# Patient Record
Sex: MALE | Race: OTHER | HISPANIC OR LATINO | Employment: UNEMPLOYED | URBAN - METROPOLITAN AREA
[De-identification: names, ages, dates, MRNs, and addresses within clinical notes are randomized per-mention and may not be internally consistent; named-entity substitution may affect disease eponyms.]

---

## 2023-02-16 ENCOUNTER — HOSPITAL ENCOUNTER (EMERGENCY)
Facility: HOSPITAL | Age: 18
Discharge: HOME | End: 2023-02-16
Attending: EMERGENCY MEDICINE
Payer: OTHER GOVERNMENT

## 2023-02-16 VITALS
DIASTOLIC BLOOD PRESSURE: 61 MMHG | WEIGHT: 180 LBS | OXYGEN SATURATION: 99 % | TEMPERATURE: 99 F | SYSTOLIC BLOOD PRESSURE: 126 MMHG | RESPIRATION RATE: 20 BRPM | HEART RATE: 73 BPM | BODY MASS INDEX: 31.89 KG/M2 | HEIGHT: 63 IN

## 2023-02-16 DIAGNOSIS — R07.9 CHEST PAIN: ICD-10-CM

## 2023-02-16 LAB
ALBUMIN SERPL BCP-MCNC: 4.5 G/DL (ref 3.5–5.2)
ALP SERPL-CCNC: 86 U/L (ref 55–135)
ALT SERPL W/O P-5'-P-CCNC: 52 U/L (ref 10–44)
ANION GAP SERPL CALC-SCNC: 11 MMOL/L (ref 8–16)
AST SERPL-CCNC: 32 U/L (ref 10–40)
BASOPHILS # BLD AUTO: 0.05 K/UL (ref 0–0.2)
BASOPHILS NFR BLD: 0.5 % (ref 0–1.9)
BILIRUB SERPL-MCNC: 0.4 MG/DL (ref 0.1–1)
BUN SERPL-MCNC: 10 MG/DL (ref 6–20)
CALCIUM SERPL-MCNC: 10 MG/DL (ref 8.7–10.5)
CHLORIDE SERPL-SCNC: 103 MMOL/L (ref 95–110)
CO2 SERPL-SCNC: 24 MMOL/L (ref 23–29)
CREAT SERPL-MCNC: 0.8 MG/DL (ref 0.5–1.4)
DIFFERENTIAL METHOD: ABNORMAL
EOSINOPHIL # BLD AUTO: 0.1 K/UL (ref 0–0.5)
EOSINOPHIL NFR BLD: 0.6 % (ref 0–8)
ERYTHROCYTE [DISTWIDTH] IN BLOOD BY AUTOMATED COUNT: 13.3 % (ref 11.5–14.5)
EST. GFR  (NO RACE VARIABLE): >60 ML/MIN/1.73 M^2
GLUCOSE SERPL-MCNC: 101 MG/DL (ref 70–110)
HCT VFR BLD AUTO: 45.1 % (ref 40–54)
HGB BLD-MCNC: 15.6 G/DL (ref 14–18)
IMM GRANULOCYTES # BLD AUTO: 0.05 K/UL (ref 0–0.04)
IMM GRANULOCYTES NFR BLD AUTO: 0.5 % (ref 0–0.5)
LYMPHOCYTES # BLD AUTO: 1.5 K/UL (ref 1–4.8)
LYMPHOCYTES NFR BLD: 15 % (ref 18–48)
MCH RBC QN AUTO: 30.3 PG (ref 27–31)
MCHC RBC AUTO-ENTMCNC: 34.6 G/DL (ref 32–36)
MCV RBC AUTO: 88 FL (ref 82–98)
MONOCYTES # BLD AUTO: 0.7 K/UL (ref 0.3–1)
MONOCYTES NFR BLD: 7.2 % (ref 4–15)
NEUTROPHILS # BLD AUTO: 7.6 K/UL (ref 1.8–7.7)
NEUTROPHILS NFR BLD: 76.2 % (ref 38–73)
NRBC BLD-RTO: 0 /100 WBC
PLATELET # BLD AUTO: 444 K/UL (ref 150–450)
PMV BLD AUTO: 9 FL (ref 9.2–12.9)
POTASSIUM SERPL-SCNC: 4.3 MMOL/L (ref 3.5–5.1)
PROT SERPL-MCNC: 9.2 G/DL (ref 6–8.4)
RBC # BLD AUTO: 5.15 M/UL (ref 4.6–6.2)
SODIUM SERPL-SCNC: 138 MMOL/L (ref 136–145)
TROPONIN I SERPL DL<=0.01 NG/ML-MCNC: <0.006 NG/ML (ref 0–0.03)
WBC # BLD AUTO: 10 K/UL (ref 3.9–12.7)

## 2023-02-16 PROCEDURE — 80053 COMPREHEN METABOLIC PANEL: CPT

## 2023-02-16 PROCEDURE — 99285 EMERGENCY DEPT VISIT HI MDM: CPT | Mod: 25

## 2023-02-16 PROCEDURE — 84484 ASSAY OF TROPONIN QUANT: CPT

## 2023-02-16 PROCEDURE — 93010 EKG 12-LEAD: ICD-10-PCS | Mod: ,,, | Performed by: INTERNAL MEDICINE

## 2023-02-16 PROCEDURE — 93005 ELECTROCARDIOGRAM TRACING: CPT

## 2023-02-16 PROCEDURE — 93010 ELECTROCARDIOGRAM REPORT: CPT | Mod: ,,, | Performed by: INTERNAL MEDICINE

## 2023-02-16 PROCEDURE — 96374 THER/PROPH/DIAG INJ IV PUSH: CPT

## 2023-02-16 PROCEDURE — 63600175 PHARM REV CODE 636 W HCPCS

## 2023-02-16 PROCEDURE — 85025 COMPLETE CBC W/AUTO DIFF WBC: CPT

## 2023-02-16 RX ORDER — KETOROLAC TROMETHAMINE 30 MG/ML
15 INJECTION, SOLUTION INTRAMUSCULAR; INTRAVENOUS
Status: COMPLETED | OUTPATIENT
Start: 2023-02-16 | End: 2023-02-16

## 2023-02-16 RX ORDER — NAPROXEN 500 MG/1
500 TABLET ORAL 2 TIMES DAILY
Qty: 20 TABLET | Refills: 0 | Status: SHIPPED | OUTPATIENT
Start: 2023-02-16

## 2023-02-16 RX ADMIN — KETOROLAC TROMETHAMINE 15 MG: 30 INJECTION, SOLUTION INTRAMUSCULAR; INTRAVENOUS at 08:02

## 2023-02-16 NOTE — DISCHARGE INSTRUCTIONS

## 2023-02-16 NOTE — ED PROVIDER NOTES
Encounter Date: 2/16/2023    SCRIBE #1 NOTE: I, Danika Chowdhury, am scribing for, and in the presence of,  Wilfredo Eaton PA-C. Other sections scribed: HPI, ROS.     History     Chief Complaint   Patient presents with    Chest Pain     The patient reports a 4/10, intermittent, mid sternal, right anterior and right upper back pain that started this morning while running. Patient states that he has been having this pain intermittently for months, and reports that he was [place] with [place]. Pt reports that he had sob this morning while running but the sob has since subsided. Denies nausea, vomiting, lightheadedness.     CC: Chest pain    HPI: This is a 17 y.o. M who has no PMHx who presents to the ED for emergent evaluation of acute right sided CP that is worse with movement that began 6 months ago. Pt endorses intermittent left sided CP. However, he states that the CP is significant on the right side. He rated the CP a 7/10 when running this morning. The CP has subsided since running this morning and currently rates the CP a 3/10. He describes the CP as a tightening sensation. He attempted taking Ibuprofen for the CP for 2 weeks, which he last took 2-3 weeks ago. No OTC treatment attempted PTA. He endorses intermittent SOB, but not currently. His vaccinations are up to date. He has no known drug allergies. Pt denies fever, chills, cough, nasal congestion, runny nose, sore throat, adbominal pain, nausea, vomiting, diarrhea, dysuria, or hematuria.      The history is provided by the patient. No  was used.   Review of patient's allergies indicates:  No Known Allergies  History reviewed. No pertinent past medical history.  History reviewed. No pertinent surgical history.  History reviewed. No pertinent family history.  Social History     Tobacco Use    Smoking status: Never    Smokeless tobacco: Never   Substance Use Topics    Alcohol use: Not Currently     Drug use: Never     Review of Systems   Constitutional:  Negative for chills and fever.   HENT:  Negative for congestion, rhinorrhea and sore throat.    Respiratory:  Negative for cough and shortness of breath.    Cardiovascular:  Positive for chest pain.   Gastrointestinal:  Negative for abdominal pain, diarrhea, nausea and vomiting.   Genitourinary:  Negative for dysuria and hematuria.   Musculoskeletal:  Negative for back pain.   Skin:  Negative for rash.   Neurological:  Negative for weakness.   Hematological:  Does not bruise/bleed easily.     Physical Exam     Initial Vitals [02/16/23 0738]   BP Pulse Resp Temp SpO2   128/65 92 16 98.1 °F (36.7 °C) 97 %      MAP       --         Physical Exam    Nursing note and vitals reviewed.  Constitutional: He appears well-developed and well-nourished. He is not diaphoretic.  Non-toxic appearance. No distress.   HENT:   Head: Normocephalic and atraumatic.   Right Ear: Hearing, tympanic membrane, external ear and ear canal normal. Tympanic membrane is not perforated, not erythematous and not bulging.   Left Ear: Hearing, tympanic membrane, external ear and ear canal normal. Tympanic membrane is not perforated, not erythematous and not bulging.   Nose: Nose normal.   Mouth/Throat: Uvula is midline and oropharynx is clear and moist.   Eyes: Conjunctivae and EOM are normal.   Neck: Neck supple.   Normal range of motion.   Full passive range of motion without pain.     Cardiovascular:            Pulses:       Radial pulses are 2+ on the right side and 2+ on the left side.   Pulmonary/Chest:   No erythema, edema, bruising, rash, or cellulitis patient's chest.   Musculoskeletal:      Cervical back: Full passive range of motion without pain, normal range of motion and neck supple. No rigidity.     Neurological: He is alert.   Skin: Skin is warm and dry. No rash noted.       ED Course   Procedures  Labs Reviewed   CBC W/ AUTO DIFFERENTIAL - Abnormal; Notable for the following  "components:       Result Value    MPV 9.0 (*)     Immature Grans (Abs) 0.05 (*)     Gran % 76.2 (*)     Lymph % 15.0 (*)     All other components within normal limits   COMPREHENSIVE METABOLIC PANEL - Abnormal; Notable for the following components:    Total Protein 9.2 (*)     ALT 52 (*)     All other components within normal limits   TROPONIN I     EKG Readings: (Independently Interpreted)   Initial Reading: No STEMI. Rhythm: Normal Sinus Rhythm. Heart Rate: 88.   Rightward axis.   ECG Results              EKG 12-lead (Final result)  Result time 02/16/23 16:57:10      Final result by Interface, Lab In [facility] (02/16/23 16:57:10)                   Narrative:    Test Reason : R07.9,    Vent. Rate : 090 BPM     Atrial Rate : 090 BPM     P-R Int : 140 ms          QRS Dur : 090 ms      QT Int : 340 ms       P-R-T Axes : 063 099 037 degrees     QTc Int : 415 ms    Normal sinus rhythm  Rightward axis  Borderline Abnormal ECG  No previous ECGs available  Confirmed by Luis Fernando Khanna MD ([id number]) on 2/16/2023 4:56:57 PM    Referred By: [email or web address]   SELF           Confirmed By:Luis Fernando Khanna MD                                  Imaging Results              X-Ray Chest PA And Lateral (Final result)  Result time 02/16/23 08:14:02      Final result by Orlando Chan MD (02/16/23 08:14:02)                   Impression:      No acute cardiopulmonary finding.      Electronically signed by: Orlando Chan MD  Date:    02/16/2023  Time:    08:14               Narrative:    EXAMINATION:  XR CHEST PA AND LATERAL    CLINICAL HISTORY:  Provided history is "  Chest pain, unspecified".    TECHNIQUE:  Frontal and lateral views of the chest were performed.    COMPARISON:  None.    FINDINGS:  Cardiac silhouette is not enlarged. No focal consolidation.  No sizable pleural effusion.  No pneumothorax.                                       Medications   ketorolac injection 15 mg (15 mg Intravenous Given 2/16/23 0844)     Medical Decision Making: "   Clinical Tests:   Lab Tests: Ordered and Reviewed  Radiological Study: Ordered and Reviewed  Medical Tests: Ordered and Reviewed  ED Management:  This is a 17 y.o. M who has no PMHx who presents to the ED for emergent evaluation of acute right sided CP that is worse with movement that began 6 months ago. On physical exam, patient is well-appearing and in no acute distress.  Nontoxic appearing.  Lungs are clear to auscultation bilaterally.  Abdomen is soft and nontender.  No guarding, rigidity, rebound.  2+ radial pulses bilaterally.  Posterior oropharynx is not erythematous.  No edema or exudate.  Uvula midline.  Bilateral tympanic membrane is normal.  No erythema, bulging, or perforations.  Neuro intact.  Strength and sensation intact bilateral upper and lower extremities.  Full range of motion neck.  No neck rigidity.  No erythema, edema, bruising, rash, or cellulitis patient's chest.  EKG revealed normal sinus rhythm with a rate of 88.  Rightward axis.  No STEMI.  Chest x-ray revealed no acute cardiopulmonary processes.  No evidence of any pleural effusions, focal consolidations, pneumothorax.  CBC unremarkable.  CMP revealed ALT 52.  Troponin unremarkable.  Doubt MI.  Will discharge patient on naproxen as needed for pain.  Urged prompt follow-up with PCP for further evaluation.    Strict return precautions given. I discussed with the patient/family the diagnosis, treatment plan, indications for return to the emergency department, and for expected follow-up. The patient/family verbalized an understanding. The patient/family is asked if there are any questions or concerns. We discuss the case, until all issues are addressed to the patient/family's satisfaction. Patient/family understands and is agreeable to the plan. Patient is stable and ready for discharge.          Scribe Attestation:   Scribe #1: I performed the above scribed service and the documentation accurately describes the services I performed. I  attest to the accuracy of the note.                 I, Wilfredo Eaton, personally performed the services described in this documentation. All medical record entries made by the scribe were at my direction and in my presence. I have reviewed the chart and agree that the record reflects my personal performance and is accurate and complete.    Clinical Impression:   Final diagnoses:  [R07.9] Chest pain        ED Disposition Condition    Discharge Stable          ED Prescriptions       Medication Sig Dispense Start Date End Date Auth. Provider    naproxen (NAPROSYN) 500 MG tablet Take 1 tablet (500 mg total) by mouth 2 (two) times daily. 20 tablet 2/16/2023 -- Wilfredo Eaton PA-C          Follow-up Information       Follow up With Specialties Details Why Contact Info     [facility]  Schedule an appointment as soon as possible for a visit in 2 days for further evaluation [address] [facility] [address]  [phone]      [facility] [place] - Emergency Dept Emergency Medicine In 2 days If symptoms worsen [address] [facility] [id number] [phone]             Wilfredo Eaton PA-C  02/16/23 2384

## 2023-02-16 NOTE — Clinical Note
"Todd"Danie Bailey was seen and treated in our emergency department on 2/16/2023.  He may return to work on 02/17/2023.       If you have any questions or concerns, please don't hesitate to call.      Wilfredo Eaton PA-C"

## 2024-01-05 ENCOUNTER — HOSPITAL ENCOUNTER (EMERGENCY)
Facility: HOSPITAL | Age: 19
Discharge: HOME | End: 2024-01-05
Attending: EMERGENCY MEDICINE
Payer: OTHER GOVERNMENT

## 2024-01-05 VITALS
BODY MASS INDEX: 31.89 KG/M2 | DIASTOLIC BLOOD PRESSURE: 90 MMHG | HEIGHT: 63 IN | SYSTOLIC BLOOD PRESSURE: 138 MMHG | RESPIRATION RATE: 16 BRPM | TEMPERATURE: 98 F | WEIGHT: 180 LBS | OXYGEN SATURATION: 98 % | HEART RATE: 86 BPM

## 2024-01-05 DIAGNOSIS — J06.9 VIRAL URI WITH COUGH: Primary | ICD-10-CM

## 2024-01-05 LAB
CTP QC/QA: YES
MOLECULAR STREP A: NEGATIVE
POC MOLECULAR INFLUENZA A AGN: NEGATIVE
POC MOLECULAR INFLUENZA B AGN: NEGATIVE
SARS-COV-2 RDRP RESP QL NAA+PROBE: NEGATIVE

## 2024-01-05 PROCEDURE — 87651 STREP A DNA AMP PROBE: CPT

## 2024-01-05 PROCEDURE — 99282 EMERGENCY DEPT VISIT SF MDM: CPT

## 2024-01-05 PROCEDURE — 87635 SARS-COV-2 COVID-19 AMP PRB: CPT | Performed by: EMERGENCY MEDICINE

## 2024-01-05 PROCEDURE — 87502 INFLUENZA DNA AMP PROBE: CPT

## 2024-01-05 NOTE — DISCHARGE INSTRUCTIONS
You were seen in the emergency department for a sore throat, aches, cough, stuffy nose. Your flu, covid, and strep swabs are negative. Your exam is otherwise reassuring.  You do not have a fever here.  We gave you some anti-inflammatory medication here and you felt better.  We think you're safe to go home.  Please follow up with your primary care provider.  You should start to feel better in a few days.  Please return for any new or worsening symptoms, dizziness, chest pain, difficulty breathing, inability to swallow, or you become concerned in any other way.

## 2024-01-05 NOTE — ED PROVIDER NOTES
"Encounter Date: 1/5/2024       History     Chief Complaint   Patient presents with    Cough     Pt reports to the ED with C/O productive cough, sore throat, and chest wall tightness when coughing x 4 days. Pt reports " I am coughing up thick yellow mucus and my ribs hurt a lot from coughing." Pt also reports sore throat. Pt swabbed at triage. Pt placed in QT room for eval.      19 yo male presenting with productive cough, sore throat, and chest wall tightness when coughing x 4 days. Pt reports " I am coughing up thick yellow mucus and my ribs hurt a lot from coughing." Pt also reports sore throat.  Denies fevers, chills, chest pain, abdominal pain, vomiting.  Reports mild nausea.       Review of patient's allergies indicates:  No Known Allergies  History reviewed. No pertinent past medical history.  History reviewed. No pertinent surgical history.  History reviewed. No pertinent family history.  Social History     Tobacco Use    Smoking status: Never    Smokeless tobacco: Never   Substance Use Topics    Alcohol use: Not Currently    Drug use: Never     Review of Systems   Constitutional:  Positive for fatigue. Negative for chills and fever.   HENT:  Positive for sore throat.    Respiratory:  Positive for cough. Negative for shortness of breath.    Cardiovascular:  Negative for chest pain.   Gastrointestinal:  Positive for nausea. Negative for vomiting.   Genitourinary:  Negative for dysuria.   Musculoskeletal:  Positive for myalgias. Negative for back pain.   Skin:  Negative for rash.   Neurological:  Negative for weakness.       Physical Exam     Initial Vitals [01/05/24 0746]   BP Pulse Resp Temp SpO2   (!) 138/90 86 16 98.4 °F (36.9 °C) 98 %      MAP       --         Physical Exam    Nursing note and vitals reviewed.  Constitutional: He appears well-developed and well-nourished. He is not diaphoretic. No distress.   HENT:   Head: Normocephalic and atraumatic.   Eyes: Conjunctivae and EOM are normal. Pupils are " equal, round, and reactive to light. No scleral icterus.   Neck: Neck supple.   Normal range of motion.  Cardiovascular:  Normal rate, regular rhythm, normal heart sounds and intact distal pulses.     Exam reveals no gallop and no friction rub.       No murmur heard.  Pulmonary/Chest: Breath sounds normal. No stridor. No respiratory distress. He has no wheezes. He has no rhonchi. He has no rales.   Abdominal: Abdomen is soft. Bowel sounds are normal. He exhibits no distension. There is no abdominal tenderness. There is no rebound and no guarding.   Musculoskeletal:         General: No tenderness or edema. Normal range of motion.      Cervical back: Normal range of motion and neck supple.     Neurological: He is alert and oriented to person, place, and time. He has normal strength. No cranial nerve deficit.   Skin: Skin is warm and dry. No rash noted.   Psychiatric: He has a normal mood and affect. His behavior is normal.         ED Course   Procedures  Labs Reviewed   POCT INFLUENZA A/B MOLECULAR   SARS-COV-2 RDRP GENE   POCT STREP A MOLECULAR          Imaging Results    None          Medications - No data to display  Medical Decision Making  Amount and/or Complexity of Data Reviewed  Labs: ordered.                          Medical Decision Making:   Initial Assessment:   18-year-old male presenting with viral illness.  On exam, well-appearing in no acute distress.  No fever or tachycardia.  No tachypnea, retractions, respiratory distress.  No significant desaturation with pulse ox, 98% on RA.  He has no significant pain or other complaints concerning for other infectious cause.  Symptoms presented, mainly cough, fever, body aches likely consistent with viral process. Swabs are negative for flu/covid/strep.  He is young and has no risk factors especially concerning for expected deterioration.  I have discussed in depth the disease course, expected reasons for concern and return precautions.  Discussed return  precautions as well as symptomatic treatment.  Patient is amenable to the plan.      Differential Diagnosis:   Viral illness, flu, COVID, rhino virus             Clinical Impression:  Final diagnoses:  [J06.9] Viral URI with cough (Primary)          ED Disposition Condition    Discharge Stable          ED Prescriptions    None       Follow-up Information       Follow up With Specialties Details Why Contact Info    [facility] [place] Emergency Dept Emergency Medicine  As needed, If symptoms worsen [address] [facility] [place] [address]  [phone]             Fabricio Enriquez MD  01/05/24 1500

## 2024-07-22 ENCOUNTER — HOSPITAL ENCOUNTER (EMERGENCY)
Facility: HOSPITAL | Age: 19
Discharge: HOME | End: 2024-07-22
Payer: OTHER GOVERNMENT

## 2024-07-22 VITALS
RESPIRATION RATE: 18 BRPM | WEIGHT: 180 LBS | HEART RATE: 103 BPM | OXYGEN SATURATION: 97 % | SYSTOLIC BLOOD PRESSURE: 139 MMHG | BODY MASS INDEX: 30.73 KG/M2 | TEMPERATURE: 100 F | DIASTOLIC BLOOD PRESSURE: 67 MMHG | HEIGHT: 64 IN

## 2024-07-22 DIAGNOSIS — J02.0 STREP PHARYNGITIS: ICD-10-CM

## 2024-07-22 DIAGNOSIS — U07.1 COVID-19: Primary | ICD-10-CM

## 2024-07-22 DIAGNOSIS — U07.1 COVID-19 VIRUS DETECTED: ICD-10-CM

## 2024-07-22 DIAGNOSIS — R05.9 COUGH: ICD-10-CM

## 2024-07-22 LAB
CTP QC/QA: YES
CTP QC/QA: YES
MOLECULAR STREP A: POSITIVE
SARS-COV-2 RDRP RESP QL NAA+PROBE: POSITIVE

## 2024-07-22 PROCEDURE — 87635 SARS-COV-2 COVID-19 AMP PRB: CPT | Performed by: EMERGENCY MEDICINE

## 2024-07-22 PROCEDURE — 87651 STREP A DNA AMP PROBE: CPT

## 2024-07-22 PROCEDURE — 25000003 PHARM REV CODE 250: Performed by: PHYSICIAN ASSISTANT

## 2024-07-22 PROCEDURE — 99284 EMERGENCY DEPT VISIT MOD MDM: CPT | Mod: 25

## 2024-07-22 RX ORDER — BENZONATATE 100 MG/1
100 CAPSULE ORAL
Status: COMPLETED | OUTPATIENT
Start: 2024-07-22 | End: 2024-07-22

## 2024-07-22 RX ORDER — PHENOL 1.4 %
AEROSOL, SPRAY (ML) MUCOUS MEMBRANE
Qty: 177 ML | Refills: 0 | Status: SHIPPED | OUTPATIENT
Start: 2024-07-22 | End: 2024-07-29

## 2024-07-22 RX ORDER — ACETAMINOPHEN 500 MG
1000 TABLET ORAL
Status: COMPLETED | OUTPATIENT
Start: 2024-07-22 | End: 2024-07-22

## 2024-07-22 RX ORDER — CETIRIZINE HYDROCHLORIDE 10 MG/1
10 TABLET ORAL DAILY
Qty: 14 TABLET | Refills: 0 | Status: SHIPPED | OUTPATIENT
Start: 2024-07-22 | End: 2024-08-05

## 2024-07-22 RX ORDER — ALBUTEROL SULFATE 90 UG/1
1-2 AEROSOL, METERED RESPIRATORY (INHALATION) EVERY 6 HOURS PRN
Qty: 18 G | Refills: 0 | Status: SHIPPED | OUTPATIENT
Start: 2024-07-22 | End: 2024-08-21

## 2024-07-22 RX ORDER — BENZONATATE 100 MG/1
100 CAPSULE ORAL 3 TIMES DAILY PRN
Qty: 20 CAPSULE | Refills: 0 | Status: SHIPPED | OUTPATIENT
Start: 2024-07-22 | End: 2024-07-29

## 2024-07-22 RX ORDER — IBUPROFEN 600 MG/1
600 TABLET ORAL EVERY 6 HOURS PRN
Qty: 20 TABLET | Refills: 0 | Status: SHIPPED | OUTPATIENT
Start: 2024-07-22 | End: 2024-07-27

## 2024-07-22 RX ORDER — AMOXICILLIN 500 MG/1
1000 CAPSULE ORAL DAILY
Qty: 20 CAPSULE | Refills: 0 | Status: SHIPPED | OUTPATIENT
Start: 2024-07-23 | End: 2024-08-02

## 2024-07-22 RX ORDER — AMOXICILLIN 250 MG/1
1000 CAPSULE ORAL
Status: COMPLETED | OUTPATIENT
Start: 2024-07-22 | End: 2024-07-22

## 2024-07-22 RX ORDER — ACETAMINOPHEN 500 MG
500 TABLET ORAL EVERY 4 HOURS PRN
Qty: 20 TABLET | Refills: 0 | Status: SHIPPED | OUTPATIENT
Start: 2024-07-22 | End: 2024-07-27

## 2024-07-22 RX ADMIN — BENZONATATE 100 MG: 100 CAPSULE ORAL at 03:07

## 2024-07-22 RX ADMIN — AMOXICILLIN 1000 MG: 250 CAPSULE ORAL at 03:07

## 2024-07-22 RX ADMIN — ACETAMINOPHEN 1000 MG: 500 TABLET ORAL at 03:07

## 2024-07-22 NOTE — DISCHARGE INSTRUCTIONS

## 2024-07-22 NOTE — ED PROVIDER NOTES
"Encounter Date: 7/22/2024    SCRIBE #1 NOTE: I, Ish Cohen, am scribing for, and in the presence of,  Elsie Carreon PA-C. I have scribed the following portions of the note - Other sections scribed: HPI, ROS, PE.       History     Chief Complaint   Patient presents with    Cough     Pt c/o prod cough w/ "yellow" sputum, HA, body aches and subjective fever since last pm.  Temp now 100.7, Dayquil taken at 8am     CC: Cough    HPI:   19 y/o M with no PMHx , presents to the emergency department for evaluation of a productive cough with yellow sputum beginning yesterday PM. Patient reports associated yellow rhinorrhea, a subjective fever, mid-sternal pleuritic chest pain, diarrhea, sore throat, generalized myalgias, and a headache. Patient notes recent sick contact with 3 people at work but is unsure of the Dx. Patient attempted Tx with Dayquil this AM. No other exacerbating or alleviating factors. Patient denies chills, nasal congestion, sinus pain/pressure, dyspnea, abdominal pain, nausea, emesis, or other associated symptoms. NKDA.           The history is provided by the patient. No  was used.     Review of patient's allergies indicates:  No Known Allergies  No past medical history on file.  No past surgical history on file.  No family history on file.  Social History     Tobacco Use    Smoking status: Never    Smokeless tobacco: Never   Substance Use Topics    Alcohol use: Not Currently    Drug use: Never     Review of Systems   Constitutional:  Positive for fever (subjective). Negative for chills.   HENT:  Positive for rhinorrhea and sore throat. Negative for congestion, ear pain, sinus pressure and sinus pain.    Eyes:  Negative for redness.   Respiratory:  Positive for cough (productive). Negative for shortness of breath and stridor.    Cardiovascular:  Positive for chest pain (midsternal pleuritic).   Gastrointestinal:  Positive for diarrhea. Negative for abdominal pain, constipation, " nausea and vomiting.   Genitourinary:  Negative for dysuria, frequency, hematuria and urgency.   Musculoskeletal:  Positive for myalgias (generalized). Negative for back pain and neck pain.   Skin:  Negative for rash.   Neurological:  Positive for headaches. Negative for dizziness, speech difficulty, weakness, light-headedness and numbness.   Hematological:  Does not bruise/bleed easily.   Psychiatric/Behavioral:  Negative for confusion.        Physical Exam     Initial Vitals [07/22/24 1452]   BP Pulse Resp Temp SpO2   (!) 144/64 (!) 117 19 (!) 100.7 °F (38.2 °C) 96 %      MAP       --         Physical Exam    Nursing note and vitals reviewed.  Constitutional: He appears well-developed and well-nourished.  Non-toxic appearance. He does not have a sickly appearance. No distress.   Ill but nontoxic     HENT:   Head: Normocephalic.   Right Ear: External ear normal.   Left Ear: External ear normal.   Nose: Mucosal edema present. Right sinus exhibits no maxillary sinus tenderness and no frontal sinus tenderness. Left sinus exhibits no maxillary sinus tenderness and no frontal sinus tenderness.   Mouth/Throat: Uvula is midline. Mucous membranes are dry. No trismus in the jaw. Posterior oropharyngeal erythema present. No oropharyngeal exudate or posterior oropharyngeal edema.   Eyes: Conjunctivae and EOM are normal.   Neck: No tracheal deviation present.   Normal range of motion.  Cardiovascular:  Regular rhythm, normal heart sounds and normal pulses.   Tachycardia present.         Pulmonary/Chest: Effort normal and breath sounds normal. No tachypnea and no bradypnea. No respiratory distress.   Musculoskeletal:         General: Normal range of motion.      Cervical back: Normal range of motion.     Neurological: He is alert and oriented to person, place, and time. GCS eye subscore is 4. GCS verbal subscore is 5. GCS motor subscore is 6.   Skin: Skin is warm and dry. No rash noted.   Psychiatric: He has a normal mood and  affect. His behavior is normal. Judgment and thought content normal.         ED Course   Procedures  Labs Reviewed   POCT STREP A MOLECULAR - Abnormal       Result Value    Molecular Strep A, POC Positive (*)      Acceptable Yes     SARS-COV-2 RDRP GENE - Abnormal    POC Rapid COVID Positive (*)      Acceptable Yes            Imaging Results              X-Ray Chest AP Portable (Final result)  Result time 07/22/24 15:39:13      Final result by Zhen Chua MD (07/22/24 15:39:13)                   Impression:      No acute process.      Electronically signed by: Zhen Chua MD  Date:    07/22/2024  Time:    15:39               Narrative:    EXAMINATION:  XR CHEST AP PORTABLE    CLINICAL HISTORY:  Cough, unspecified    TECHNIQUE:  Single frontal view of the chest was performed.    COMPARISON:  02/16/2023.    FINDINGS:  The trachea is unremarkable.  The cardiomediastinal silhouette is within normal limits.  The hilar structures are unremarkable.  There is no evidence of free air beneath the hemidiaphragms.  There are no pleural effusions.  There is no evidence of a pneumothorax.  There is no evidence of pneumomediastinum.  No airspace opacity is present.  The osseous structures are unremarkable.                                       Medications   acetaminophen tablet 1,000 mg (1,000 mg Oral Given 7/22/24 1513)   benzonatate capsule 100 mg (100 mg Oral Given 7/22/24 1513)   amoxicillin capsule 1,000 mg (1,000 mg Oral Given 7/22/24 1516)     Medical Decision Making  18 yr old patient presenting with constellation of symptoms most c/w COVID 19 with a positive COVID 19 test.     Also considered but less likely:  PTA/RPA: no hot potato voice, no uvular deviation,  Esophageal rupture: No history of dysphagia  Unlikely deep space infection/Rubin's  Low suspicion for CNS infection or bacterial sinusitis given exam and history.  Strep:  also positive. Amoxil given in ED.   Chest  xray:independently interpreted and negative for pna ptx or acute abnormality      Patient given tylenol tessalon in the emergency department. To be discharged home on amoxil for strep and meds for symptomatic tx. No respiratory distress, otherwise relatively well appearing and nontoxic.  O2 sats of 97% and patient in no acute distress. Return precautions given, patient understands and agrees with plan. All questions answered.  Instructed to follow up with PCP. There is currently no accepted treatment except conservative measures and respiratory support if appropriate.  This patient will be discharged home with strict return precautions if worsening respiratory status.  Patient was made aware other resource limitations and the fact that they could have worsening symptoms.  Patient was informed to quarantine themselves for per guidelines.                Amount and/or Complexity of Data Reviewed  Labs: ordered. Decision-making details documented in ED Course.  Radiology: ordered. Decision-making details documented in ED Course.    Risk  OTC drugs.  Prescription drug management.            Scribe Attestation:   Scribe #1: I performed the above scribed service and the documentation accurately describes the services I performed. I attest to the accuracy of the note.                           I, Elsie Carreon PA-C, personally performed the services described in this documentation. All medical record entries made by the scribe were at my direction and in my presence. I have reviewed the chart and agree that the record reflects my personal performance and is accurate and complete.     Clinical Impression:  Final diagnoses:  [R05.9] Cough  [U07.1] COVID-19 (Primary)  [J02.0] Strep pharyngitis          ED Disposition Condition    Discharge           ED Prescriptions       Medication Sig Dispense Start Date End Date Auth. Provider    amoxicillin (AMOXIL) 500 MG capsule Take 2 capsules (1,000 mg total) by mouth once daily. for  10 days 20 capsule 7/23/2024 8/2/2024 Elsie Carreon PA-C    ibuprofen (ADVIL,MOTRIN) 600 MG tablet Take 1 tablet (600 mg total) by mouth every 6 (six) hours as needed. 20 tablet 7/22/2024 7/27/2024 Elsie Carreon PA-C    acetaminophen (TYLENOL) 500 MG tablet Take 1 tablet (500 mg total) by mouth every 4 (four) hours as needed. 20 tablet 7/22/2024 7/27/2024 Elsie Carreon PA-C    benzonatate (TESSALON) 100 MG capsule Take 1 capsule (100 mg total) by mouth 3 (three) times daily as needed. 20 capsule 7/22/2024 7/29/2024 Elsie Carreon PA-C    cetirizine (ZYRTEC) 10 MG tablet Take 1 tablet (10 mg total) by mouth once daily. for 14 days 14 tablet 7/22/2024 8/5/2024 Elsie Carreon PA-C    albuterol (PROVENTIL/VENTOLIN HFA) 90 mcg/actuation inhaler Inhale 1-2 puffs into the lungs every 6 (six) hours as needed for Wheezing or Shortness of Breath. Rescue 18 g 7/22/2024 8/21/2024 Elsie Carreon PA-C    phenoL (CHLORASEPTIC THROAT SPRAY) 1.4 % SprA by Mucous Membrane route every 2 (two) hours. for 7 days 177 mL 7/22/2024 7/29/2024 Elsie Carreon PA-C          Follow-up Information       Follow up With Specialties Details Why Contact Info    Your Primary Care Doctor  Schedule an appointment as soon as possible for a visit in 2 days      [facility] Emergency Dept Emergency Medicine Go to  As needed, If symptoms worsen [address] [facility] [place] [id number] [phone]             Elsie Carreon PA-C  07/22/24 1171

## 2024-07-22 NOTE — Clinical Note
"Todd "Danie Bailey was seen and treated in our emergency department on 7/22/2024.     COVID-19 is present in our communities across the state. There is limited testing for COVID at this time, so not all patients can be tested. In this situation, your employee meets the following criteria:    Todd Bailey has met the criteria for COVID-19 testing and has a POSITIVE result. He can return to work once they are asymptomatic for 24 hours without the use of fever reducing medications AND at least five days from the first positive result. A mask is recommended for 5 days post quarantine.     If you have any questions or concerns, or if I can be of further assistance, please do not hesitate to contact me.    Sincerely,             Elsie Carreon PA-C"